# Patient Record
(demographics unavailable — no encounter records)

---

## 2024-11-08 NOTE — HISTORY OF PRESENT ILLNESS
[de-identified] : 81-year-old male with 1 month left hip pain.  No associated injuries.  Reports this started after doing yard work.  He has buttock pain, lateral hip pain.  The pain is increased with stairs and prolonged walking.  He denies groin pain or radiating pain distally.  He denies numbness or tingling.  Denies prior hip surgeries or injections.  He has been treating with Tylenol.

## 2024-11-08 NOTE — DISCUSSION/SUMMARY
[de-identified] : Chronic left hip pain secondary to abductor tendinitis, trochanteric bursitis. I discussed the treatment with the patient at length today. Noninvasive and nonoperative treatment modalities include weight reduction, activity modification with low impact exercise, PRN use of acetaminophen or anti-inflammatory medication if tolerated, glucosamine/chondroitin supplements, and physical therapy. Further treatments can include corticosteroid injection.  He was given referral to physical therapy.  Follow-up 4 months

## 2024-11-08 NOTE — HISTORY OF PRESENT ILLNESS
[de-identified] : 81-year-old male with 1 month left hip pain.  No associated injuries.  Reports this started after doing yard work.  He has buttock pain, lateral hip pain.  The pain is increased with stairs and prolonged walking.  He denies groin pain or radiating pain distally.  He denies numbness or tingling.  Denies prior hip surgeries or injections.  He has been treating with Tylenol.

## 2024-11-08 NOTE — DISCUSSION/SUMMARY
[de-identified] : Chronic left hip pain secondary to abductor tendinitis, trochanteric bursitis. I discussed the treatment with the patient at length today. Noninvasive and nonoperative treatment modalities include weight reduction, activity modification with low impact exercise, PRN use of acetaminophen or anti-inflammatory medication if tolerated, glucosamine/chondroitin supplements, and physical therapy. Further treatments can include corticosteroid injection.  He was given referral to physical therapy.  Follow-up 4 months

## 2024-11-08 NOTE — PHYSICAL EXAM
[Normal] : Gait: normal [de-identified] : General: No acute distress Mental: Alert and oriented x3 Eyes: Conjunctivitis not seen Chest: Symmetric chest rise, no audible wheezing Skin: Bilateral lower extremities absent from rashes and ulcers Abdomen: No distention  Left hip: Skin: Clean, dry and intact Inspection: No obvious deformity, no swelling, no ecchymosis. Tenderness: positive tenderness over greater trochanter/gluteus medius insertion. No tenderness pubic symphysis, pubic tubercle, hip flexors. No tenderness ischial tuberosity or buttock. Nontender over the ASIS/Illiac crest. ROM: 0-120. Internal rotation 40, external rotation 70 Special tests: negative Stinchfield, negative FADIR, positive DIEGO, negative logroll Additional tests: No pain with circumduction, negative impingement test at 90 Strength: 5/5 hip flexion/Abduction/Q/H/TA/GS/EHL Neuro: Sensation intact to light touch throughout in dp/sp/tib/priscilla/saph distributions Pulses: 2+ DP/PT pulses [de-identified] : Pelvis and left hip x-ray shows mild joint space narrowing, no osteophytes, no fracture, appropriate alignment, outlet pelvis

## 2024-11-08 NOTE — PHYSICAL EXAM
[Normal] : Gait: normal [de-identified] : General: No acute distress Mental: Alert and oriented x3 Eyes: Conjunctivitis not seen Chest: Symmetric chest rise, no audible wheezing Skin: Bilateral lower extremities absent from rashes and ulcers Abdomen: No distention  Left hip: Skin: Clean, dry and intact Inspection: No obvious deformity, no swelling, no ecchymosis. Tenderness: positive tenderness over greater trochanter/gluteus medius insertion. No tenderness pubic symphysis, pubic tubercle, hip flexors. No tenderness ischial tuberosity or buttock. Nontender over the ASIS/Illiac crest. ROM: 0-120. Internal rotation 40, external rotation 70 Special tests: negative Stinchfield, negative FADIR, positive DIEGO, negative logroll Additional tests: No pain with circumduction, negative impingement test at 90 Strength: 5/5 hip flexion/Abduction/Q/H/TA/GS/EHL Neuro: Sensation intact to light touch throughout in dp/sp/tib/priscilla/saph distributions Pulses: 2+ DP/PT pulses [de-identified] : Pelvis and left hip x-ray shows mild joint space narrowing, no osteophytes, no fracture, appropriate alignment, outlet pelvis

## 2025-01-28 NOTE — HISTORY OF PRESENT ILLNESS
[de-identified] : Reports improved hip pain and function with physical therapy.  Continues to have mild to moderate lateral hip pain and weakness.  Reports limping at times.  Pain is tolerable and controlled with NSAIDs.  Lateral hip pain radiating to the lateral thigh.  Denies buttock or anterior pain.  His physical therapy is recommended to get checked again.

## 2025-01-28 NOTE — PHYSICAL EXAM
[Normal] : Gait: normal [de-identified] : General: No acute distress Mental: Alert and oriented x3 Eyes: Conjunctivitis not seen Chest: Symmetric chest rise, no audible wheezing Skin: Bilateral lower extremities absent from rashes and ulcers Abdomen: No distention  Left hip: Skin: Clean, dry and intact Inspection: No obvious deformity, no swelling, no ecchymosis. Tenderness: positive tenderness over greater trochanter/gluteus medius insertion. No tenderness pubic symphysis, pubic tubercle, hip flexors. No tenderness ischial tuberosity or buttock. Nontender over the ASIS/Illiac crest. ROM: 0-120. Internal rotation 40, external rotation 70 Special tests: negative Stinchfield, negative FADIR, positive DIEGO, negative logroll Additional tests: No pain with circumduction, negative impingement test at 90 Strength: 5/5 hip flexion/Abduction/Q/H/TA/GS/EHL Neuro: Sensation intact to light touch throughout in dp/sp/tib/priscilla/saph distributions Pulses: 2+ DP/PT pulses

## 2025-01-28 NOTE — DISCUSSION/SUMMARY
[de-identified] : Chronic left hip pain secondary to abductor tendinitis, gluteal tendinopathy, and trochanteric bursitis. Clinically improving with physical therapy, and residual weakness.  This may be secondary to partial gluteal muscle tear. I discussed continued management at length.  He will continue physical therapy and strengthening exercises.  Discussed possible cortisone injection.  Voltaren gel prescribed.   Follow-up 4 months

## 2025-03-04 NOTE — HISTORY OF PRESENT ILLNESS
[de-identified] : Reports persistent left hip pain along the lateral aspect.  Pain has been present for over 5 months.  He has done physical therapy without much relief.  He takes NSAIDs as needed, and reports urinary urgency with ibuprofen.  He has tried topical Voltaren.  He has continued weakness in the hip.  Denies radiation to the thigh or groin.  Reports limping at times.  Denies buttock or anterior pain.

## 2025-03-04 NOTE — DISCUSSION/SUMMARY
[de-identified] : Chronic left hip pain secondary to abductor tendinitis, gluteal tendinopathy, and trochanteric bursitis.  We discussed possibility of abductor tear.  He has had persistent symptoms despite physical therapy, activity modifications, Tylenol or NSAIDs.  I recommend MRI of the left hip to further evaluate trochanteric bursitis.  We discussed possible cortisone injection following MRI.  I recommended stretching exercises, lidocaine patch, ice or heat, and Tylenol or NSAIDs as needed.  Meloxicam prescribed, side effects reviewed.  Follow-up 1-2 weeks

## 2025-03-04 NOTE — PHYSICAL EXAM
[Normal] : Gait: normal [de-identified] : General: No acute distress Mental: Alert and oriented x3 Eyes: Conjunctivitis not seen Chest: Symmetric chest rise, no audible wheezing Skin: Bilateral lower extremities absent from rashes and ulcers Abdomen: No distention  Left hip: Skin: Clean, dry and intact Inspection: No obvious deformity, no swelling, no ecchymosis. Tenderness: positive tenderness over greater trochanter/gluteus medius insertion. No tenderness pubic symphysis, pubic tubercle, hip flexors. No tenderness ischial tuberosity or buttock. Nontender over the ASIS/Illiac crest. ROM: 0-120. Internal rotation 40, external rotation 70 Special tests: negative Stinchfield, negative FADIR, positive DIEGO, negative logroll Additional tests: No pain with circumduction, negative impingement test at 90 Strength: 5/5 hip flexion/Abduction/Q/H/TA/GS/EHL Neuro: Sensation intact to light touch throughout in dp/sp/tib/priscilla/saph distributions Pulses: 2+ DP/PT pulses

## 2025-03-19 NOTE — DISCUSSION/SUMMARY
[de-identified] : Chronic left hip pain secondary to abductor tendinitis, gluteal tendinopathy, and trochanteric bursitis.  There is partial gluteus medius tear and trochanteric bursitis.  We discussed etiology and management.  We discussed his symptoms of persistent symptoms despite physical therapy, activity modifications, Tylenol or NSAIDs.  Cortisone injection was performed to the right trochanteric bursa.  I recommended stretching exercises, lidocaine patch, ice or heat, and Tylenol or NSAIDs as needed.  Follow-up 3 months

## 2025-03-19 NOTE — PROCEDURE
[de-identified] : Injection: Left hip trochanteric bursa Indication: Trochanteric bursitis and gluteus medius partial tear   A discussion was had with the patient regarding this procedure and all questions were answered. All risks, benefits and alternatives were discussed. These included but were not limited to bleeding, infection, and allergic reaction. Alcohol was used to clean the skin, and betadine was used to sterilize and prep the area in the lateral aspect of the left hip. Ethyl chloride spray was then used as a topical anesthetic. A 22-gauge needle was used to inject 4cc of 2% lidocaine and 1cc of 40mg Kenalog into the trochanteric bursa space. A sterile bandage was then applied. The patient tolerated the procedure well and there were no complications.

## 2025-03-19 NOTE — DISCUSSION/SUMMARY
[de-identified] : Chronic left hip pain secondary to abductor tendinitis, gluteal tendinopathy, and trochanteric bursitis.  There is partial gluteus medius tear and trochanteric bursitis.  We discussed etiology and management.  We discussed his symptoms of persistent symptoms despite physical therapy, activity modifications, Tylenol or NSAIDs.  Cortisone injection was performed to the right trochanteric bursa.  I recommended stretching exercises, lidocaine patch, ice or heat, and Tylenol or NSAIDs as needed.  Follow-up 3 months

## 2025-03-19 NOTE — HISTORY OF PRESENT ILLNESS
[de-identified] : Reports persistent left hip pain along the lateral aspect.  Pain has been present for over 5 months.  He has done physical therapy without much relief.  He takes NSAIDs as needed, and reports urinary urgency with ibuprofen.  He has tried topical Voltaren.  He has continued weakness in the hip.  Denies radiation to the thigh or groin.  Reports limping at times.  Denies buttock or anterior pain.  Reports intermittent pain, worse with stairs and prolonged walking. Previously has tried meloxicam and voltaren. Here for MRI results.

## 2025-03-19 NOTE — HISTORY OF PRESENT ILLNESS
[de-identified] : Reports persistent left hip pain along the lateral aspect.  Pain has been present for over 5 months.  He has done physical therapy without much relief.  He takes NSAIDs as needed, and reports urinary urgency with ibuprofen.  He has tried topical Voltaren.  He has continued weakness in the hip.  Denies radiation to the thigh or groin.  Reports limping at times.  Denies buttock or anterior pain.  Reports intermittent pain, worse with stairs and prolonged walking. Previously has tried meloxicam and voltaren. Here for MRI results.

## 2025-03-19 NOTE — PHYSICAL EXAM
[Normal] : Gait: normal [de-identified] : General: No acute distress Mental: Alert and oriented x3 Eyes: Conjunctivitis not seen Chest: Symmetric chest rise, no audible wheezing Skin: Bilateral lower extremities absent from rashes and ulcers Abdomen: No distention  Left hip: Skin: Clean, dry and intact Inspection: No obvious deformity, no swelling, no ecchymosis. Tenderness: positive tenderness over greater trochanter/gluteus medius insertion. No tenderness pubic symphysis, pubic tubercle, hip flexors. No tenderness ischial tuberosity or buttock. Nontender over the ASIS/Illiac crest. ROM: 0-120. Internal rotation 40, external rotation 70 Special tests: negative Stinchfield, negative FADIR, positive DIEGO, negative logroll Additional tests: No pain with circumduction, negative impingement test at 90 Strength: 5/5 hip flexion/Abduction/Q/H/TA/GS/EHL Neuro: Sensation intact to light touch throughout in dp/sp/tib/priscilla/saph distributions Pulses: 2+ DP/PT pulses [de-identified] : MRI left hip 3/6/25: IMPRESSION: Moderate undersurface tearing along the insertional fibers of the gluteus minimus and medius tendons with surrounding peritendinous edema. Mild overlying greater trochanteric bursitis. Chronic high-grade tearing at the conjoined biceps femoris/semitendinosus contribution of the hamstring tendon. Chronic mild undersurface tearing at the semimembranosus contribution. Mild left hip arthrosis.

## 2025-03-19 NOTE — PROCEDURE
[de-identified] : Injection: Left hip trochanteric bursa Indication: Trochanteric bursitis and gluteus medius partial tear   A discussion was had with the patient regarding this procedure and all questions were answered. All risks, benefits and alternatives were discussed. These included but were not limited to bleeding, infection, and allergic reaction. Alcohol was used to clean the skin, and betadine was used to sterilize and prep the area in the lateral aspect of the left hip. Ethyl chloride spray was then used as a topical anesthetic. A 22-gauge needle was used to inject 4cc of 2% lidocaine and 1cc of 40mg Kenalog into the trochanteric bursa space. A sterile bandage was then applied. The patient tolerated the procedure well and there were no complications.

## 2025-03-19 NOTE — PHYSICAL EXAM
[Normal] : Gait: normal [de-identified] : General: No acute distress Mental: Alert and oriented x3 Eyes: Conjunctivitis not seen Chest: Symmetric chest rise, no audible wheezing Skin: Bilateral lower extremities absent from rashes and ulcers Abdomen: No distention  Left hip: Skin: Clean, dry and intact Inspection: No obvious deformity, no swelling, no ecchymosis. Tenderness: positive tenderness over greater trochanter/gluteus medius insertion. No tenderness pubic symphysis, pubic tubercle, hip flexors. No tenderness ischial tuberosity or buttock. Nontender over the ASIS/Illiac crest. ROM: 0-120. Internal rotation 40, external rotation 70 Special tests: negative Stinchfield, negative FADIR, positive DIEGO, negative logroll Additional tests: No pain with circumduction, negative impingement test at 90 Strength: 5/5 hip flexion/Abduction/Q/H/TA/GS/EHL Neuro: Sensation intact to light touch throughout in dp/sp/tib/priscilla/saph distributions Pulses: 2+ DP/PT pulses [de-identified] : MRI left hip 3/6/25: IMPRESSION: Moderate undersurface tearing along the insertional fibers of the gluteus minimus and medius tendons with surrounding peritendinous edema. Mild overlying greater trochanteric bursitis. Chronic high-grade tearing at the conjoined biceps femoris/semitendinosus contribution of the hamstring tendon. Chronic mild undersurface tearing at the semimembranosus contribution. Mild left hip arthrosis.

## 2025-05-13 NOTE — DISCUSSION/SUMMARY
[de-identified] : Chronic left hip pain secondary to abductor tendinitis, gluteal tendinopathy, and trochanteric bursitis.  There is partial gluteus medius tear and trochanteric bursitis.  We discussed etiology and management.  He reports mild improvement from cortisone injection at last visit.  Minimal pain on exam today. Meloxicam prescribed and side effects reviewed including GI distress Continue stretching and strengthening exercises as shown by PT Surgery is not warranted at this time Follow-up 6 months

## 2025-05-13 NOTE — PHYSICAL EXAM
[Normal] : Gait: normal [de-identified] : General: No acute distress Mental: Alert and oriented x3 Eyes: Conjunctivitis not seen Chest: Symmetric chest rise, no audible wheezing Skin: Bilateral lower extremities absent from rashes and ulcers Abdomen: No distention  Left hip: Skin: Clean, dry and intact Inspection: No obvious deformity, no swelling, no ecchymosis. Tenderness: positive tenderness over greater trochanter/gluteus medius insertion. No tenderness pubic symphysis, pubic tubercle, hip flexors. No tenderness ischial tuberosity or buttock. Nontender over the ASIS/Illiac crest. ROM: 0-120. Internal rotation 40, external rotation 70 Special tests: negative Stinchfield, negative FADIR, positive DIEGO, negative logroll Additional tests: No pain with circumduction, negative impingement test at 90 Strength: 5/5 hip flexion/Abduction/Q/H/TA/GS/EHL Neuro: Sensation intact to light touch throughout in dp/sp/tib/priscilla/saph distributions Pulses: 2+ DP/PT pulses

## 2025-05-13 NOTE — PHYSICAL EXAM
[Normal] : Gait: normal [de-identified] : General: No acute distress Mental: Alert and oriented x3 Eyes: Conjunctivitis not seen Chest: Symmetric chest rise, no audible wheezing Skin: Bilateral lower extremities absent from rashes and ulcers Abdomen: No distention  Left hip: Skin: Clean, dry and intact Inspection: No obvious deformity, no swelling, no ecchymosis. Tenderness: positive tenderness over greater trochanter/gluteus medius insertion. No tenderness pubic symphysis, pubic tubercle, hip flexors. No tenderness ischial tuberosity or buttock. Nontender over the ASIS/Illiac crest. ROM: 0-120. Internal rotation 40, external rotation 70 Special tests: negative Stinchfield, negative FADIR, positive DIEGO, negative logroll Additional tests: No pain with circumduction, negative impingement test at 90 Strength: 5/5 hip flexion/Abduction/Q/H/TA/GS/EHL Neuro: Sensation intact to light touch throughout in dp/sp/tib/priscilla/saph distributions Pulses: 2+ DP/PT pulses

## 2025-05-13 NOTE — HISTORY OF PRESENT ILLNESS
[de-identified] : Reports persistent pain in the left hip, with slight improvement from cortisone injection. He has pain with prolonged walking and stairs. Pain is on the lateral aspect. He does stretching about 2 times per week. He wants to stop his statins to see if that helps.

## 2025-05-13 NOTE — HISTORY OF PRESENT ILLNESS
[de-identified] : Reports persistent pain in the left hip, with slight improvement from cortisone injection. He has pain with prolonged walking and stairs. Pain is on the lateral aspect. He does stretching about 2 times per week. He wants to stop his statins to see if that helps.

## 2025-05-13 NOTE — DISCUSSION/SUMMARY
[de-identified] : Chronic left hip pain secondary to abductor tendinitis, gluteal tendinopathy, and trochanteric bursitis.  There is partial gluteus medius tear and trochanteric bursitis.  We discussed etiology and management.  He reports mild improvement from cortisone injection at last visit.  Minimal pain on exam today. Meloxicam prescribed and side effects reviewed including GI distress Continue stretching and strengthening exercises as shown by PT Surgery is not warranted at this time Follow-up 6 months